# Patient Record
Sex: FEMALE | Race: WHITE
[De-identification: names, ages, dates, MRNs, and addresses within clinical notes are randomized per-mention and may not be internally consistent; named-entity substitution may affect disease eponyms.]

---

## 2018-03-15 ENCOUNTER — HOSPITAL ENCOUNTER (INPATIENT)
Dept: HOSPITAL 74 - JER | Age: 38
LOS: 1 days | Discharge: LEFT BEFORE DISCHARGE | DRG: 54 | End: 2018-03-16
Attending: INTERNAL MEDICINE | Admitting: INTERNAL MEDICINE
Payer: COMMERCIAL

## 2018-03-15 VITALS — BODY MASS INDEX: 16.9 KG/M2

## 2018-03-15 DIAGNOSIS — H53.149: ICD-10-CM

## 2018-03-15 DIAGNOSIS — F32.9: ICD-10-CM

## 2018-03-15 DIAGNOSIS — F43.10: ICD-10-CM

## 2018-03-15 DIAGNOSIS — G43.801: Primary | ICD-10-CM

## 2018-03-15 DIAGNOSIS — F17.210: ICD-10-CM

## 2018-03-15 DIAGNOSIS — F41.9: ICD-10-CM

## 2018-03-15 DIAGNOSIS — Z91.410: ICD-10-CM

## 2018-03-15 LAB
ANION GAP SERPL CALC-SCNC: 6 MMOL/L (ref 8–16)
BASOPHILS # BLD: 1.1 % (ref 0–2)
BUN SERPL-MCNC: 13 MG/DL (ref 7–18)
CALCIUM SERPL-MCNC: 8.2 MG/DL (ref 8.5–10.1)
CHLORIDE SERPL-SCNC: 104 MMOL/L (ref 98–107)
CO2 SERPL-SCNC: 28 MMOL/L (ref 21–32)
CREAT SERPL-MCNC: 0.7 MG/DL (ref 0.55–1.02)
DEPRECATED RDW RBC AUTO: 13.9 % (ref 11.6–15.6)
EOSINOPHIL # BLD: 2.6 % (ref 0–4.5)
GLUCOSE SERPL-MCNC: 77 MG/DL (ref 74–106)
HCT VFR BLD CALC: 38.6 % (ref 32.4–45.2)
HGB BLD-MCNC: 12.9 GM/DL (ref 10.7–15.3)
LYMPHOCYTES # BLD: 44.1 % (ref 8–40)
MAGNESIUM SERPL-MCNC: 1.9 MG/DL (ref 1.8–2.4)
MCH RBC QN AUTO: 30.6 PG (ref 25.7–33.7)
MCHC RBC AUTO-ENTMCNC: 33.6 G/DL (ref 32–36)
MCV RBC: 91.3 FL (ref 80–96)
MONOCYTES # BLD AUTO: 6.6 % (ref 3.8–10.2)
NEUTROPHILS # BLD: 45.6 % (ref 42.8–82.8)
PLATELET # BLD AUTO: 217 K/MM3 (ref 134–434)
PMV BLD: 8.6 FL (ref 7.5–11.1)
POTASSIUM SERPLBLD-SCNC: 3.5 MMOL/L (ref 3.5–5.1)
RBC # BLD AUTO: 4.22 M/MM3 (ref 3.6–5.2)
SODIUM SERPL-SCNC: 138 MMOL/L (ref 136–145)
WBC # BLD AUTO: 6.7 K/MM3 (ref 4–10)

## 2018-03-15 NOTE — PDOC
Rapid Medical Evaluation


Time Seen by Provider: 03/15/18 18:28


Medical Evaluation: 





03/15/18 18:28


37 year old female with history of migraines, sent by Dr. Myrick for admission 

for status migrainosus. Currently with 2/10 headache. On Botox injections for 

headaches as well as Percocet without relief. Reports allergies to steroids and 

Imitrex.





-Basic labs


-To Main ED for further evaluation/admission

## 2018-03-15 NOTE — PDOC
*Physical Exam





- Vital Signs


 Last Vital Signs











Temp Pulse Resp BP Pulse Ox


 


 97.7 F   82   18   93/73   97 


 


 03/15/18 18:28  03/15/18 18:28  03/15/18 18:28  03/15/18 18:28  03/15/18 18:28














ED Treatment Course





- LABORATORY


CBC & Chemistry Diagram: 


 03/15/18 19:01





 03/15/18 19:01





- ADDITIONAL ORDERS


Additional order review: 


 Laboratory  Results











  03/15/18





  19:01


 


Sodium  138


 


Potassium  3.5


 


Chloride  104


 


Carbon Dioxide  28


 


Anion Gap  6 L


 


BUN  13


 


Creatinine  0.7


 


Random Glucose  77


 


Calcium  8.2 L


 


Magnesium  1.9








 











  03/15/18





  19:01


 


RBC  4.22


 


MCV  91.3


 


MCHC  33.6


 


RDW  13.9


 


MPV  8.6


 


Neutrophils %  45.6


 


Lymphocytes %  44.1 H


 


Monocytes %  6.6


 


Eosinophils %  2.6


 


Basophils %  1.1














Medical Decision Making





- Medical Decision Making





03/15/18 23:02


Pt admitted for h/o intractable migraines.  pt sent  in by Dr. Myrick for 

evaluation and treatment of her migraines





*DC/Admit/Observation/Transfer


Diagnosis at time of Disposition: 


 Status migrainosus








- Discharge Dispostion


Condition at time of disposition: Stable





- Referrals


Referrals: 


Yuri Myrick DO [Primary Care Provider] - 





- Patient Instructions





- Post Discharge Activity
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
## 2018-03-16 VITALS — TEMPERATURE: 98.2 F | DIASTOLIC BLOOD PRESSURE: 62 MMHG | HEART RATE: 65 BPM | SYSTOLIC BLOOD PRESSURE: 101 MMHG

## 2018-03-16 LAB
ALBUMIN SERPL-MCNC: 3.2 G/DL (ref 3.4–5)
ALP SERPL-CCNC: 33 U/L (ref 45–117)
ALT SERPL-CCNC: 12 U/L (ref 12–78)
ANION GAP SERPL CALC-SCNC: 10 MMOL/L (ref 8–16)
APPEARANCE UR: CLEAR
AST SERPL-CCNC: 9 U/L (ref 15–37)
BASOPHILS # BLD: 1.5 % (ref 0–2)
BILIRUB SERPL-MCNC: 0.5 MG/DL (ref 0.2–1)
BILIRUB UR STRIP.AUTO-MCNC: NEGATIVE MG/DL
BUN SERPL-MCNC: 10 MG/DL (ref 7–18)
CALCIUM SERPL-MCNC: 8 MG/DL (ref 8.5–10.1)
CHLORIDE SERPL-SCNC: 107 MMOL/L (ref 98–107)
CO2 SERPL-SCNC: 25 MMOL/L (ref 21–32)
COLOR UR: (no result)
CREAT SERPL-MCNC: 0.7 MG/DL (ref 0.55–1.02)
DEPRECATED RDW RBC AUTO: 13.6 % (ref 11.6–15.6)
EOSINOPHIL # BLD: 4.7 % (ref 0–4.5)
GLUCOSE SERPL-MCNC: 64 MG/DL (ref 74–106)
HCG UR QL: NEGATIVE
HCT VFR BLD CALC: 33.3 % (ref 32.4–45.2)
HGB BLD-MCNC: 11.2 GM/DL (ref 10.7–15.3)
KETONES UR QL STRIP: NEGATIVE
LEUKOCYTE ESTERASE UR QL STRIP.AUTO: NEGATIVE
LYMPHOCYTES # BLD: 57 % (ref 8–40)
MAGNESIUM SERPL-MCNC: 2.3 MG/DL (ref 1.8–2.4)
MCH RBC QN AUTO: 30.3 PG (ref 25.7–33.7)
MCHC RBC AUTO-ENTMCNC: 33.6 G/DL (ref 32–36)
MCV RBC: 90.2 FL (ref 80–96)
MONOCYTES # BLD AUTO: 6.7 % (ref 3.8–10.2)
NEUTROPHILS # BLD: 30.1 % (ref 42.8–82.8)
NITRITE UR QL STRIP: NEGATIVE
PH UR: 5 [PH] (ref 5–8)
PHOSPHATE SERPL-MCNC: 4.3 MG/DL (ref 2.5–4.9)
PLATELET # BLD AUTO: 176 K/MM3 (ref 134–434)
PMV BLD: 8.9 FL (ref 7.5–11.1)
POTASSIUM SERPLBLD-SCNC: 4.1 MMOL/L (ref 3.5–5.1)
PROT SERPL-MCNC: 5.7 G/DL (ref 6.4–8.2)
PROT UR QL STRIP: NEGATIVE
PROT UR QL STRIP: NEGATIVE
RBC # BLD AUTO: 3.69 M/MM3 (ref 3.6–5.2)
RBC # UR STRIP: NEGATIVE /UL
SODIUM SERPL-SCNC: 142 MMOL/L (ref 136–145)
SP GR UR: 1.01 (ref 1–1.03)
UROBILINOGEN UR STRIP-MCNC: NEGATIVE MG/DL (ref 0.2–1)
WBC # BLD AUTO: 6.5 K/MM3 (ref 4–10)

## 2018-03-16 RX ADMIN — GABAPENTIN SCH MG: 300 CAPSULE ORAL at 08:18

## 2018-03-16 RX ADMIN — GABAPENTIN SCH MG: 300 CAPSULE ORAL at 00:50

## 2018-03-16 RX ADMIN — GABAPENTIN SCH: 300 CAPSULE ORAL at 06:46

## 2018-03-16 RX ADMIN — DIHYDROERGOTAMINE MESYLATE SCH MG: 1 INJECTION, SOLUTION INTRAMUSCULAR; INTRAVENOUS; SUBCUTANEOUS at 10:46

## 2018-03-16 RX ADMIN — GABAPENTIN SCH: 300 CAPSULE ORAL at 18:22

## 2018-03-16 RX ADMIN — DIHYDROERGOTAMINE MESYLATE SCH: 1 INJECTION, SOLUTION INTRAMUSCULAR; INTRAVENOUS; SUBCUTANEOUS at 06:56

## 2018-03-16 RX ADMIN — METOCLOPRAMIDE SCH: 5 INJECTION, SOLUTION INTRAMUSCULAR; INTRAVENOUS at 16:47

## 2018-03-16 RX ADMIN — DIHYDROERGOTAMINE MESYLATE SCH: 1 INJECTION, SOLUTION INTRAMUSCULAR; INTRAVENOUS; SUBCUTANEOUS at 18:20

## 2018-03-16 RX ADMIN — GABAPENTIN SCH MG: 300 CAPSULE ORAL at 14:04

## 2018-03-16 RX ADMIN — FLUOXETINE HYDROCHLORIDE SCH: 20 CAPSULE ORAL at 00:53

## 2018-03-16 RX ADMIN — FLUOXETINE HYDROCHLORIDE SCH MG: 20 CAPSULE ORAL at 00:50

## 2018-03-16 RX ADMIN — METOCLOPRAMIDE SCH MG: 5 INJECTION, SOLUTION INTRAMUSCULAR; INTRAVENOUS at 09:58

## 2018-03-16 NOTE — PN
Physical Exam: 


SUBJECTIVE: Patient seen and examined








OBJECTIVE:





 Vital Signs


 Intake & Output











 03/13/18 03/14/18 03/15/18 03/16/18





 23:59 23:59 23:59 23:59


 


Intake Total   764 


 


Balance   764 


 


Weight   52.163 kg 

















 Period  Temp  Pulse  Resp  BP Sys/Nash  Pulse Ox


 


 Last 24 Hr  97.5 F-97.8 F  72-82  18-20  79-95/43-73  97











GENERAL: The patient is awake, alert, and fully oriented, in no acute distress.


HEAD: Normal with no signs of trauma.


EYES: PERRL, extraocular movements intact, sclera anicteric, conjunctiva clear. 

No ptosis. 


ENT: Ears normal, nares patent, oropharynx clear without exudates, moist mucous 


membranes.


NECK: Trachea midline, full range of motion, supple. 


LUNGS: Breath sounds equal, clear to auscultation bilaterally, no wheezes, no 

crackles, no 


accessory muscle use. 


HEART: Regular rate and rhythm, S1, S2 without murmur, rub or gallop.


ABDOMEN: Soft, nontender, nondistended, normoactive bowel sounds, no guarding, 

no 


rebound, no hepatosplenomegaly, no masses.


EXTREMITIES: 2+ pulses, warm, well-perfused, no edema. 


NEUROLOGICAL: Cranial nerves II through XII grossly intact. Normal speech, gait 

not 


observed.


PSYCH: Normal mood, normal affect.


SKIN: Warm, dry, normal turgor, no rashes or lesions noted














 Laboratory Results - last 24 hr


 CBC, BMP


 CBC, BMP





 03/16/18 06:30 





 03/16/18 06:30 








 03/15/18 19:01 





 03/15/18 19:01 














  03/15/18 03/15/18 03/16/18





  19:01 19:01 00:18


 


WBC  6.7  


 


RBC  4.22  


 


Hgb  12.9  


 


Hct  38.6  


 


MCV  91.3  


 


MCH  30.6  


 


MCHC  33.6  


 


RDW  13.9  


 


Plt Count  217  


 


MPV  8.6  


 


Neutrophils %  45.6  


 


Lymphocytes %  44.1 H  


 


Monocytes %  6.6  


 


Eosinophils %  2.6  


 


Basophils %  1.1  


 


Sodium   138 


 


Potassium   3.5 


 


Chloride   104 


 


Carbon Dioxide   28 


 


Anion Gap   6 L 


 


BUN   13 


 


Creatinine   0.7 


 


Random Glucose   77 


 


Calcium   8.2 L 


 


Magnesium   1.9 


 


Urine Color    Ltyellow


 


Urine Appearance    Clear


 


Urine pH    5.0


 


Ur Specific Gravity    1.010


 


Urine Protein    Negative


 


Urine Glucose (UA)    Negative


 


Urine Ketones    Negative


 


Urine Blood    Negative


 


Urine Nitrite    Negative


 


Urine Bilirubin    Negative


 


Urine Urobilinogen    Negative


 


Ur Leukocyte Esterase    Negative


 


Urine HCG, Qual    Negative








Active Medications











Generic Name Dose Route Start Last Admin





  Trade Name Freq  PRN Reason Stop Dose Admin


 


Acetaminophen  650 mg  03/15/18 22:29  





  Tylenol -  PO   





  Q4H PRN   





  PAIN LEVEL 1 - 3   


 


Clonazepam  1 mg  03/16/18 08:00  





  Klonopin -  PO   





  0800,1300,1800,2300 [facility]   


 


Dihydroergotamine Mesylate  0.5 mg  03/16/18 01:45  03/16/18 06:56





  D.H.E. -  IVPUSH   Not Given





  Q8H-IV [facility]   


 


Diphenhydramine HCl  25 mg  03/16/18 08:00  





  Benadryl Injection -  IVPB   





  Q8H [facility]   


 


Enoxaparin Sodium  40 mg  03/16/18 10:00  





  Lovenox -  SQ   





  DAILY [facility]   


 


Fluoxetine HCl  80 mg  03/16/18 13:00  





  Prozac -  PO   





  DAILY@1300 [facility]   


 


Gabapentin  300 mg  03/16/18 08:00  





  Neurontin -  PO   





  0800,1300,1800,2300 [facility]   


 


Hydroxyzine HCl  25 mg  03/16/18 00:03  





  Atarax -  PO   





  BID PRN   





  [facility]   


 


Sodium Chloride  1,000 mls @ 83 mls/hr  03/15/18 23:15  03/15/18 23:57





  Normal Saline -  IV   83 mls/hr





  ASDIR [facility]   Administration


 


Metoclopramide HCl  10 mg  03/16/18 08:00  





  Reglan Injection -  IVPB   





  Q8H [facility]   


 


Trazodone HCl  200 mg  03/16/18 23:00  





  Desyrel -  PO   





  DAILY@2300 [facility]   











ASSESSMENT/PLAN:
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
## 2018-03-16 NOTE — PN
Teaching Attending Note


Name of Resident: Erich Deluna





ATTENDING PHYSICIAN STATEMENT





I saw and evaluated the patient.


I reviewed the resident's note and discussed the case with the resident.


I agree with the resident's findings and plan as documented.








SUBJECTIVE:


37F with PTSD, Depression, anxiety, migraines, presents with worsenign 

headaches over the past 6 weeks, sent in by Dr. Myrick





OBJECTIVE:


NAD AAOx3, comfortable appearing





K 3.5


Urine Pregnancy test negative





ASSESSMENT AND PLAN:


Complicated migraine failing outpatient management


start IV dihydroergotamine, IV Reglan, IV benadryl per Dr. Myrick 

recommendations


IVF


Neuro follow up in AM
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
